# Patient Record
Sex: FEMALE | Race: WHITE | NOT HISPANIC OR LATINO | URBAN - METROPOLITAN AREA
[De-identification: names, ages, dates, MRNs, and addresses within clinical notes are randomized per-mention and may not be internally consistent; named-entity substitution may affect disease eponyms.]

---

## 2020-07-25 ENCOUNTER — TELEPHONE ENCOUNTER (OUTPATIENT)
Dept: URBAN - METROPOLITAN AREA CLINIC 13 | Facility: CLINIC | Age: 65
End: 2020-07-25

## 2020-07-25 RX ORDER — LEVOTHYROXINE SODIUM 0.05 MG/1
TAKE 1 TABLET DAILY TABLET ORAL
Refills: 0 | OUTPATIENT
End: 2019-06-20

## 2020-07-25 RX ORDER — BUDESONIDE 0.5 MG/2ML
INHALANT ORAL
Qty: 120 | Refills: 0 | OUTPATIENT
Start: 2013-09-26 | End: 2015-12-07

## 2020-07-25 RX ORDER — ALUMINUM HYDROXIDE AND MAGNESIUM TRISILICATE 80; 14.2 MG/1; MG/1
TABLET, CHEWABLE ORAL
Refills: 0 | OUTPATIENT
Start: 2008-11-06 | End: 2009-04-30

## 2020-07-25 RX ORDER — POLYETHYLENE GLYCOL 3350, SODIUM CHLORIDE, SODIUM BICARBONATE AND POTASSIUM CHLORIDE WITH LEMON FLAVOR 420; 11.2; 5.72; 1.48 G/4L; G/4L; G/4L; G/4L
USE AS DIRECTED POWDER, FOR SOLUTION ORAL
Qty: 1 | Refills: 0 | OUTPATIENT
Start: 2019-06-20 | End: 2019-08-22

## 2020-07-25 RX ORDER — VENLAFAXINE HYDROCHLORIDE 225 MG/1
TABLET, EXTENDED RELEASE ORAL
Qty: 270 | Refills: 0 | OUTPATIENT
Start: 2019-06-21 | End: 2019-08-22

## 2020-07-25 RX ORDER — SULFAMETHOXAZOLE/TRIMETHOPRIM 800-160 MG
TAKE 1 TABLET TWICE DAILY TABLET ORAL
Refills: 0 | OUTPATIENT
End: 2018-12-12

## 2020-07-25 RX ORDER — AMOXICILLIN AND CLAVULANATE POTASSIUM 875; 125 MG/1; MG/1
TAKE 1 TABLET TWICE DAILY UNTIL FINISHED TABLET, FILM COATED ORAL
Qty: 14 | Refills: 0 | OUTPATIENT
Start: 2018-05-24 | End: 2018-06-04

## 2020-07-25 RX ORDER — PREDNISONE 10 MG/1
TAKE 1 TABLET DAILY AS DIRECTED TABLET ORAL
Refills: 0 | OUTPATIENT
End: 2018-12-12

## 2020-07-25 RX ORDER — HYOSCYAMINE SULFATE 0.12 MG/1
PLACE 1 TABLET EVERY 6 HOURS PRN TABLET, ORALLY DISINTEGRATING ORAL
Qty: 60 | Refills: 1 | OUTPATIENT
Start: 2018-05-24 | End: 2019-08-22

## 2020-07-25 RX ORDER — FLUTICASONE FUROATE AND VILANTEROL TRIFENATATE 200; 25 UG/1; UG/1
INHALE PUFFS  PRN POWDER RESPIRATORY (INHALATION)
Refills: 0 | OUTPATIENT
End: 2018-06-04

## 2020-07-25 RX ORDER — RISEDRONATE SODIUM 129; 21 MG/1; MG/1
TAKE 1 TABLET  MONTH TABLET, FILM COATED ORAL
Refills: 0 | OUTPATIENT
Start: 2009-04-30 | End: 2011-10-17

## 2020-07-26 ENCOUNTER — TELEPHONE ENCOUNTER (OUTPATIENT)
Dept: URBAN - METROPOLITAN AREA CLINIC 13 | Facility: CLINIC | Age: 65
End: 2020-07-26

## 2020-07-26 RX ORDER — FLUTICASONE PROPIONATE 110 UG/1
INHALE 2 PUFFS BY MOUTH TWICE DAILY AEROSOL, METERED RESPIRATORY (INHALATION)
Qty: 12 | Refills: 0 | Status: ACTIVE | COMMUNITY
Start: 2017-08-18

## 2020-07-26 RX ORDER — ALUMINUM HYDROXIDE AND MAGNESIUM TRISILICATE 80; 14.2 MG/1; MG/1
TAKE AS DIRECTED.UNKNOWN DOSE TABLET, CHEWABLE ORAL
Refills: 0 | Status: ACTIVE | COMMUNITY
Start: 2009-04-30

## 2020-07-26 RX ORDER — LIDOCAINE 5 G/100G
APPLY TO AFFECTED AREAS AS DIRECTED OINTMENT TOPICAL
Refills: 0 | Status: ACTIVE | COMMUNITY
Start: 2019-08-01

## 2020-07-26 RX ORDER — AZITHROMYCIN DIHYDRATE 250 MG/1
TAKE 2 TABLETS BY MOUTH TODAY, THEN TAKE 1 TABLET DAILY FOR 4 DAYS TABLET, FILM COATED ORAL
Qty: 6 | Refills: 0 | Status: ACTIVE | COMMUNITY
Start: 2017-12-20

## 2020-07-26 RX ORDER — ALENDRONATE SODIUM 70 MG/1
TAKE 1 TABLET ONCE WEEKLY TABLET ORAL
Refills: 0 | Status: ACTIVE | COMMUNITY

## 2020-07-26 RX ORDER — PREDNISONE 20 MG/1
TAKE 1 TABLET BY MOUTH EVERY DAY TABLET ORAL
Qty: 3 | Refills: 0 | Status: ACTIVE | COMMUNITY
Start: 2018-11-02

## 2020-07-26 RX ORDER — TIZANIDINE 4 MG/1
TAKE 1 TABLET BY MOUTH EVERYDAY AT BEDTIME TABLET ORAL
Qty: 30 | Refills: 0 | Status: ACTIVE | COMMUNITY
Start: 2018-10-17

## 2020-07-26 RX ORDER — ALBUTEROL SULFATE 90 UG/1
INHALE 2 PUFFS BY MOUTH EVERY 6 HOURS AS NEEDED POWDER, METERED RESPIRATORY (INHALATION)
Qty: 1 | Refills: 0 | Status: ACTIVE | COMMUNITY
Start: 2017-12-20

## 2020-07-26 RX ORDER — CALCIPOTRIENE 50 UG/G
APPLY A THIN LAYER TO AFFECTED AREA(S) TWICE DAILY CREAM TOPICAL
Refills: 0 | Status: ACTIVE | COMMUNITY
Start: 2019-08-01

## 2020-07-26 RX ORDER — TRAZODONE HYDROCHLORIDE 50 MG/1
TABLET ORAL
Qty: 30 | Refills: 0 | Status: ACTIVE | COMMUNITY
Start: 2019-05-01

## 2020-07-26 RX ORDER — FLUTICASONE PROPIONATE 110 UG/1
AEROSOL, METERED RESPIRATORY (INHALATION)
Qty: 12 | Refills: 0 | Status: ACTIVE | COMMUNITY
Start: 2019-05-01

## 2020-07-26 RX ORDER — PREDNISONE 10 MG/1
TABLET ORAL
Qty: 100 | Refills: 0 | Status: ACTIVE | COMMUNITY
Start: 2013-02-18

## 2020-07-26 RX ORDER — NAPROXEN 500 MG/1
TAKE 1 TABLET EVERY 12 HOURS AS NEEDED TABLET ORAL
Refills: 0 | Status: ACTIVE | COMMUNITY
Start: 2019-05-01

## 2020-07-26 RX ORDER — FLUCONAZOLE 150 MG/1
TAKE 1 TABLET BY MOUTH THEN REPEAT IN 72 HOURS TABLET ORAL
Qty: 2 | Refills: 0 | Status: ACTIVE | COMMUNITY
Start: 2018-04-11

## 2020-07-26 RX ORDER — BUDESONIDE 0.5 MG/2ML
USE  ONE AS DIRECTED INHALANT ORAL
Refills: 0 | Status: ACTIVE | COMMUNITY

## 2020-07-26 RX ORDER — PREDNISONE 5 MG/1
TAKE 6 TABS DAILY X4 DAYS, 4 TABS DAILY X 4DAYS , 2 TABS DAILY X4 DAYS TABLET ORAL
Qty: 48 | Refills: 0 | Status: ACTIVE | COMMUNITY
Start: 2019-04-12

## 2020-07-26 RX ORDER — CIPROFLOXACIN AND DEXAMETHASONE 3; 1 MG/ML; MG/ML
SUSPENSION/ DROPS AURICULAR (OTIC)
Qty: 8 | Refills: 0 | Status: ACTIVE | COMMUNITY
Start: 2013-05-10

## 2020-07-26 RX ORDER — LEVOTHYROXINE SODIUM 125 UG/1
TABLET ORAL
Qty: 90 | Refills: 0 | Status: ACTIVE | COMMUNITY
Start: 2013-07-09

## 2020-07-26 RX ORDER — ESTRADIOL 0.1 MG/G
CREAM VAGINAL
Qty: 42 | Refills: 0 | Status: ACTIVE | COMMUNITY
Start: 2019-07-23

## 2020-07-26 RX ORDER — TRIAMCINOLONE ACETONIDE 1 MG/G
CREAM TOPICAL
Qty: 80 | Refills: 0 | Status: ACTIVE | COMMUNITY
Start: 2019-05-30

## 2020-07-26 RX ORDER — NYSTATIN 100000 [USP'U]/G
APPLY 1 INCH DAILY AS NEEDED POWDER TOPICAL
Refills: 0 | Status: ACTIVE | COMMUNITY
Start: 2019-07-10

## 2020-07-26 RX ORDER — LEVOTHYROXINE SODIUM 0.07 MG/1
TAKE 1 TABLET DAILY TABLET ORAL
Refills: 0 | Status: ACTIVE | COMMUNITY
Start: 2019-06-06

## 2020-07-26 RX ORDER — OXYCODONE AND ACETAMINOPHEN 5; 325 MG/1; MG/1
TABLET ORAL
Qty: 20 | Refills: 0 | Status: ACTIVE | COMMUNITY
Start: 2013-08-01

## 2020-07-26 RX ORDER — FEXOFENADINE HCL 180 MG/1
TAKE 1 TABLET BY MOUTH EVERY DAY TABLET ORAL
Qty: 14 | Refills: 0 | Status: ACTIVE | COMMUNITY
Start: 2018-11-02

## 2020-07-26 RX ORDER — OFLOXACIN 3 MG/ML
SOLUTION AURICULAR (OTIC)
Qty: 5 | Refills: 0 | Status: ACTIVE | COMMUNITY
Start: 2013-08-18

## 2020-11-24 ENCOUNTER — OFFICE VISIT (OUTPATIENT)
Dept: URBAN - METROPOLITAN AREA CLINIC 113 | Facility: CLINIC | Age: 65
End: 2020-11-24
Payer: COMMERCIAL

## 2020-11-24 VITALS
DIASTOLIC BLOOD PRESSURE: 86 MMHG | WEIGHT: 134 LBS | BODY MASS INDEX: 25.3 KG/M2 | HEIGHT: 61 IN | SYSTOLIC BLOOD PRESSURE: 164 MMHG | TEMPERATURE: 98 F | HEART RATE: 72 BPM

## 2020-11-24 DIAGNOSIS — K21.9 GASTROESOPHAGEAL REFLUX DISEASE, UNSPECIFIED WHETHER ESOPHAGITIS PRESENT: ICD-10-CM

## 2020-11-24 DIAGNOSIS — R10.32 LEFT LOWER QUADRANT ABDOMINAL PAIN: ICD-10-CM

## 2020-11-24 DIAGNOSIS — R19.4 CHANGE IN BOWEL HABIT: ICD-10-CM

## 2020-11-24 DIAGNOSIS — K59.00 CONSTIPATION, UNSPECIFIED CONSTIPATION TYPE: ICD-10-CM

## 2020-11-24 PROCEDURE — 3017F COLORECTAL CA SCREEN DOC REV: CPT | Performed by: INTERNAL MEDICINE

## 2020-11-24 PROCEDURE — G9903 PT SCRN TBCO ID AS NON USER: HCPCS | Performed by: INTERNAL MEDICINE

## 2020-11-24 PROCEDURE — 74177 CT ABD & PELVIS W/CONTRAST: CPT | Performed by: INTERNAL MEDICINE

## 2020-11-24 PROCEDURE — G8427 DOCREV CUR MEDS BY ELIG CLIN: HCPCS | Performed by: INTERNAL MEDICINE

## 2020-11-24 PROCEDURE — 99213 OFFICE O/P EST LOW 20 MIN: CPT | Performed by: INTERNAL MEDICINE

## 2020-11-24 PROCEDURE — G8482 FLU IMMUNIZE ORDER/ADMIN: HCPCS | Performed by: INTERNAL MEDICINE

## 2020-11-24 RX ORDER — PREDNISONE 5 MG/1
TAKE 6 TABS DAILY X4 DAYS, 4 TABS DAILY X 4DAYS , 2 TABS DAILY X4 DAYS TABLET ORAL
Qty: 48 | Refills: 0 | Status: DISCONTINUED | COMMUNITY
Start: 2019-04-12

## 2020-11-24 RX ORDER — CALCIPOTRIENE 50 UG/G
APPLY A THIN LAYER TO AFFECTED AREA(S) TWICE DAILY CREAM TOPICAL
Refills: 0 | Status: DISCONTINUED | COMMUNITY
Start: 2019-08-01

## 2020-11-24 RX ORDER — ESTRADIOL 0.1 MG/G
CREAM VAGINAL
Qty: 42 | Refills: 0 | Status: DISCONTINUED | COMMUNITY
Start: 2019-07-23

## 2020-11-24 RX ORDER — LIDOCAINE 5 G/100G
APPLY TO AFFECTED AREAS AS DIRECTED OINTMENT TOPICAL
Refills: 0 | Status: DISCONTINUED | COMMUNITY
Start: 2019-08-01

## 2020-11-24 RX ORDER — LEVOTHYROXINE SODIUM 0.07 MG/1
TAKE 1 TABLET DAILY TABLET ORAL
Refills: 0 | Status: ACTIVE | COMMUNITY
Start: 2019-06-06

## 2020-11-24 RX ORDER — OFLOXACIN 3 MG/ML
SOLUTION AURICULAR (OTIC)
Qty: 5 | Refills: 0 | Status: DISCONTINUED | COMMUNITY
Start: 2013-08-18

## 2020-11-24 RX ORDER — TRAZODONE HYDROCHLORIDE 50 MG/1
TABLET ORAL
Qty: 30 | Refills: 0 | Status: DISCONTINUED | COMMUNITY
Start: 2019-05-01

## 2020-11-24 RX ORDER — TRIAMCINOLONE ACETONIDE 1 MG/G
CREAM TOPICAL
Qty: 80 | Refills: 0 | Status: DISCONTINUED | COMMUNITY
Start: 2019-05-30

## 2020-11-24 RX ORDER — ALENDRONATE SODIUM 70 MG/1
TAKE 1 TABLET ONCE WEEKLY TABLET ORAL
Refills: 0 | Status: ACTIVE | COMMUNITY

## 2020-11-24 RX ORDER — PREDNISONE 10 MG/1
TABLET ORAL
Qty: 100 | Refills: 0 | Status: DISCONTINUED | COMMUNITY
Start: 2013-02-18

## 2020-11-24 RX ORDER — NYSTATIN 100000 [USP'U]/G
APPLY 1 INCH DAILY AS NEEDED POWDER TOPICAL
Refills: 0 | Status: DISCONTINUED | COMMUNITY
Start: 2019-07-10

## 2020-11-24 RX ORDER — FLUTICASONE PROPIONATE 110 UG/1
INHALE 2 PUFFS BY MOUTH TWICE DAILY AEROSOL, METERED RESPIRATORY (INHALATION)
Qty: 12 | Refills: 0 | Status: ACTIVE | COMMUNITY
Start: 2017-08-18

## 2020-11-24 RX ORDER — HYDROCHLOROTHIAZIDE 12.5 MG/1
1 TABLET IN THE MORNING TABLET ORAL ONCE A DAY
Status: ACTIVE | COMMUNITY

## 2020-11-24 RX ORDER — FLUCONAZOLE 150 MG/1
TAKE 1 TABLET BY MOUTH THEN REPEAT IN 72 HOURS TABLET ORAL
Qty: 2 | Refills: 0 | Status: DISCONTINUED | COMMUNITY
Start: 2018-04-11

## 2020-11-24 RX ORDER — CIPROFLOXACIN AND DEXAMETHASONE 3; 1 MG/ML; MG/ML
SUSPENSION/ DROPS AURICULAR (OTIC)
Qty: 8 | Refills: 0 | Status: DISCONTINUED | COMMUNITY
Start: 2013-05-10

## 2020-11-24 RX ORDER — FEXOFENADINE HCL 180 MG/1
TAKE 1 TABLET BY MOUTH EVERY DAY TABLET ORAL
Qty: 14 | Refills: 0 | Status: DISCONTINUED | COMMUNITY
Start: 2018-11-02

## 2020-11-24 RX ORDER — OXYCODONE AND ACETAMINOPHEN 5; 325 MG/1; MG/1
TABLET ORAL
Qty: 20 | Refills: 0 | Status: DISCONTINUED | COMMUNITY
Start: 2013-08-01

## 2020-11-24 RX ORDER — LEVOTHYROXINE SODIUM 0.07 MG/1
1 CAPSULE TABLET ORAL ONCE A DAY
Refills: 0 | Status: DISCONTINUED | COMMUNITY
Start: 2013-07-09

## 2020-11-24 RX ORDER — OMEPRAZOLE 20 MG/1
1 CAPSULE 30 MINUTES BEFORE MORNING MEAL CAPSULE, DELAYED RELEASE ORAL ONCE A DAY
Status: ACTIVE | COMMUNITY

## 2020-11-24 RX ORDER — AZITHROMYCIN DIHYDRATE 250 MG/1
TAKE 2 TABLETS BY MOUTH TODAY, THEN TAKE 1 TABLET DAILY FOR 4 DAYS TABLET, FILM COATED ORAL
Qty: 6 | Refills: 0 | Status: DISCONTINUED | COMMUNITY
Start: 2017-12-20

## 2020-11-24 RX ORDER — TIZANIDINE 4 MG/1
TAKE 1 TABLET BY MOUTH EVERYDAY AT BEDTIME TABLET ORAL
Qty: 30 | Refills: 0 | Status: DISCONTINUED | COMMUNITY
Start: 2018-10-17

## 2020-11-24 RX ORDER — ALUMINUM HYDROXIDE AND MAGNESIUM TRISILICATE 80; 14.2 MG/1; MG/1
TAKE AS DIRECTED.UNKNOWN DOSE TABLET, CHEWABLE ORAL
Refills: 0 | Status: ACTIVE | COMMUNITY
Start: 2009-04-30

## 2020-11-24 RX ORDER — ALBUTEROL SULFATE 90 UG/1
INHALE 2 PUFFS BY MOUTH EVERY 6 HOURS AS NEEDED POWDER, METERED RESPIRATORY (INHALATION)
Qty: 1 | Refills: 0 | Status: ACTIVE | COMMUNITY
Start: 2017-12-20

## 2020-11-24 RX ORDER — PREDNISONE 20 MG/1
TAKE 1 TABLET BY MOUTH EVERY DAY TABLET ORAL
Qty: 3 | Refills: 0 | Status: DISCONTINUED | COMMUNITY
Start: 2018-11-02

## 2020-11-24 RX ORDER — NAPROXEN 500 MG/1
TAKE 1 TABLET EVERY 12 HOURS AS NEEDED TABLET ORAL
Refills: 0 | Status: ACTIVE | COMMUNITY
Start: 2019-05-01

## 2020-11-24 RX ORDER — BUDESONIDE 0.5 MG/2ML
USE  ONE AS DIRECTED INHALANT ORAL
Refills: 0 | Status: ACTIVE | COMMUNITY

## 2020-11-24 NOTE — HPI-TODAY'S VISIT:
65-year-old with history of gastroesophageal reflux disease on omeprazole 40 mg each day.  Her last colonoscopy was in August 22, 2019 that revealed a normal terminal ileum, pancolonic large diverticulosis of the colon but no colon polyps.  She had an upper endoscopy in 2008 that was fairly unremarkable Since September she has been having some abdominal pains.  They are in the lower pelvic region the left side greater than the right.  She saw her GYN about 2 days later because of persistence of the discomfort.  She had no dysuria they felt like she may have had an early UTI the culture was negative and she was treated with Macrobid and it may have improved.  However 2 weeks later she continued to have the symptoms and underwent a pelvic ultrasound that was unremarkable.  She gradually was better and then about 2 weeks ago she began having difficulty again she describes the pain is in the lower pelvic region the left side greater than the right.  It does increase when she leans forward and bends over and when she slouches.  It may temporarily be better after bowel movement.  Her bowel habits have been more more constipated over the last 3 to 4 months.  She has a bowel movement every other day and sometimes strains.  Is been no blood or melena or weight loss.  She occasionally has nausea but no vomiting.  There is been no fever no dysuria or hematuria.  She has no heartburn or dysphagia. Since September she has been having some abdominal pains.  They are in the lower pelvic region the left side greater than the right.  She saw her GYN about 2 days later because of persistence of the discomfort.  She had no dysuria they felt like she may have had an early UTI the culture was negative and she was treated with Macrobid and it may have improved.  However 2 weeks later she continued to have the symptoms and underwent a pelvic ultrasound that was unremarkable.  She gradually was better and then about 2 weeks ago she began having difficulty again she describes the pain is in the lower pelvic region the left side greater than the right.  It does increase when she leans forward and bends over and when she slouches.  It may temporarily be better after bowel movement.  Her bowel habits have been more more constipated over the last 3 to 4 months.  She has a bowel movement every other day and sometimes strains.  Is been no blood or melena or weight loss.  She occasionally has nausea but no vomiting.  There is been no fever no dysuria or hematuria.  She has no heartburn or dysphagia.

## 2020-12-03 ENCOUNTER — TELEPHONE ENCOUNTER (OUTPATIENT)
Dept: URBAN - METROPOLITAN AREA CLINIC 113 | Facility: CLINIC | Age: 65
End: 2020-12-03

## 2020-12-04 NOTE — PHYSICAL EXAM GASTROINTESTINAL
Abdomen , soft, nontender, nondistended , no guarding or rigidity , no masses palpable , normal bowel sounds , Liver and Spleen , no hepatomegaly present , no hepatosplenomegaly , liver nontender , spleen not palpable.  Mild tenderness in left groin area.  Hip movement does not change pain Pt seen     Pls advise her how to retrieve work letter I wrote today    Please advise her to continue to monitor her sx and not hesitate to seek urgent care to further evaluate her sx if no improvement.  Thanks Shauna

## 2020-12-30 ENCOUNTER — OFFICE VISIT (OUTPATIENT)
Dept: URBAN - METROPOLITAN AREA CLINIC 113 | Facility: CLINIC | Age: 65
End: 2020-12-30
Payer: COMMERCIAL

## 2020-12-30 VITALS — WEIGHT: 134 LBS | RESPIRATION RATE: 18 BRPM | HEIGHT: 61 IN | TEMPERATURE: 98.1 F | BODY MASS INDEX: 25.3 KG/M2

## 2020-12-30 DIAGNOSIS — R19.4 CHANGE IN BOWEL HABIT: ICD-10-CM

## 2020-12-30 DIAGNOSIS — K40.90 INGUINAL HERNIA: ICD-10-CM

## 2020-12-30 DIAGNOSIS — K59.00 CONSTIPATION, UNSPECIFIED CONSTIPATION TYPE: ICD-10-CM

## 2020-12-30 PROBLEM — 129851009: Status: ACTIVE | Noted: 2020-11-24

## 2020-12-30 PROBLEM — 14760008: Status: ACTIVE | Noted: 2020-11-24

## 2020-12-30 PROCEDURE — G8427 DOCREV CUR MEDS BY ELIG CLIN: HCPCS | Performed by: NURSE PRACTITIONER

## 2020-12-30 PROCEDURE — 99213 OFFICE O/P EST LOW 20 MIN: CPT | Performed by: NURSE PRACTITIONER

## 2020-12-30 PROCEDURE — G8483 FLU IMM NO ADMIN DOC REA: HCPCS | Performed by: NURSE PRACTITIONER

## 2020-12-30 RX ORDER — ALBUTEROL SULFATE 90 UG/1
INHALE 2 PUFFS BY MOUTH EVERY 6 HOURS AS NEEDED POWDER, METERED RESPIRATORY (INHALATION)
Qty: 1 | Refills: 0 | Status: ACTIVE | COMMUNITY
Start: 2017-12-20

## 2020-12-30 RX ORDER — HYDROCHLOROTHIAZIDE 12.5 MG/1
1 TABLET IN THE MORNING TABLET ORAL ONCE A DAY
Status: DISCONTINUED | COMMUNITY

## 2020-12-30 RX ORDER — FLUTICASONE PROPIONATE 110 UG/1
INHALE 2 PUFFS BY MOUTH TWICE DAILY AEROSOL, METERED RESPIRATORY (INHALATION)
Qty: 12 | Refills: 0 | Status: DISCONTINUED | COMMUNITY
Start: 2017-08-18

## 2020-12-30 RX ORDER — NAPROXEN 500 MG/1
TAKE 1 TABLET EVERY 12 HOURS AS NEEDED TABLET ORAL
Refills: 0 | Status: DISCONTINUED | COMMUNITY
Start: 2019-05-01

## 2020-12-30 RX ORDER — BUDESONIDE 0.5 MG/2ML
USE  ONE AS DIRECTED INHALANT ORAL
Refills: 0 | Status: ACTIVE | COMMUNITY

## 2020-12-30 RX ORDER — ALENDRONATE SODIUM 70 MG/1
TAKE 1 TABLET ONCE WEEKLY TABLET ORAL
Refills: 0 | Status: ACTIVE | COMMUNITY

## 2020-12-30 RX ORDER — ALUMINUM HYDROXIDE AND MAGNESIUM TRISILICATE 80; 14.2 MG/1; MG/1
TAKE AS DIRECTED.UNKNOWN DOSE TABLET, CHEWABLE ORAL
Refills: 0 | Status: DISCONTINUED | COMMUNITY
Start: 2009-04-30

## 2020-12-30 RX ORDER — LEVOTHYROXINE SODIUM 0.07 MG/1
TAKE 1 TABLET DAILY TABLET ORAL
Refills: 0 | Status: ACTIVE | COMMUNITY
Start: 2019-06-06

## 2020-12-30 RX ORDER — OMEPRAZOLE 20 MG/1
1 CAPSULE 30 MINUTES BEFORE MORNING MEAL CAPSULE, DELAYED RELEASE ORAL ONCE A DAY
Status: ACTIVE | COMMUNITY

## 2020-12-30 RX ORDER — LOSARTAN POTASSIUM AND HYDROCHLOROTHIAZIDE 50; 12.5 MG/1; MG/1
1 TABLET TABLET, FILM COATED ORAL ONCE A DAY
Status: ACTIVE | COMMUNITY

## 2020-12-30 NOTE — HPI-OTHER HISTORIES
65-year-old woman with history of gastroesophageal reflux disease on omeprazole 40 mg daily, and diverticulosis, presenting for follow-up regarding left lower abdominal pain.  She was last seen in the office on 11/24/2020 with intermittent lower pelvic pain, left greater than right.  She had been evaluated by GYN with pelvic ultrasound and urine studies both of which were unremarkable.  She was treated empirically with Macrobid for possible urinary tract infection without any benefit.  Pain has been persistent since September, worsening 2 weeks prior to the appointment.  She denied any fevers.  She was recommended CBC and CT of the abdomen and pelvis to evaluate for any evidence of diverticulitis.she was recommended IBgard to help with discomfort.  CT of the abdomen and pelvis on 12/3/2020 revealed no acute abdominopelvic findings.  There was extensive diverticulosis without evidence of diverticulitis.there is a 5 mm left renal hypodensity, too small to accurately characterize which was recommended further evaluation with renal ultrasound in 6 months. Small fat filled left inguinal hernia.  labs 11/24/2020: WBC 6.3, hemoglobin 14.3, hematocrit 43.3, MCV 92, platelets 259, BUN 11, creatinine 0.62, sodium 137, potassium 3.8, T bili 0.5, ALP 52, AST 34, ALT 22, TSH 0.211  She complains of left lower pelvic pain, likely related to the inguinal hernia. She has discomfort with positioning and crossing her legs.She has noted some improvement with an improved bowel regimen. She is taking MiraLAX every other day. She has reintroduced a high fiber diet with improvement in gas and bloating complaints. She is otherwise doing very well from a GI standpoint. She is not interested in a surgical referral at this time.

## 2021-08-23 LAB
A/G RATIO: 1.8
ALBUMIN: 4.8
ALKALINE PHOSPHATASE: 52
ALT (SGPT): 22
AST (SGOT): 34
BASO (ABSOLUTE): 0
BASOS: 1
BILIRUBIN, TOTAL: 0.5
BUN/CREATININE RATIO: 18
BUN: 11
CALCIUM: 9.6
CARBON DIOXIDE, TOTAL: 26
CHLORIDE: 94
CREATININE: 0.62
EGFR IF AFRICN AM: 109
EGFR IF NONAFRICN AM: 95
EOS (ABSOLUTE): 0.4
EOS: 7
GLOBULIN, TOTAL: 2.6
GLUCOSE: 89
HEMATOCRIT: 43.3
HEMATOLOGY COMMENTS:: (no result)
HEMOGLOBIN: 14.3
IMMATURE CELLS: (no result)
IMMATURE GRANS (ABS): 0
IMMATURE GRANULOCYTES: 0
LYMPHS (ABSOLUTE): 1.8
LYMPHS: 28
MCH: 30.5
MCHC: 33
MCV: 92
MONOCYTES(ABSOLUTE): 0.5
MONOCYTES: 9
NEUTROPHILS (ABSOLUTE): 3.5
NEUTROPHILS: 55
NRBC: (no result)
PLATELETS: 259
POTASSIUM: 3.8
PROTEIN, TOTAL: 7.4
RBC: 4.69
RDW: 12.6
SODIUM: 137
TSH: 0.21
WBC: 6.3

## 2024-03-28 ENCOUNTER — OV NP (OUTPATIENT)
Dept: URBAN - METROPOLITAN AREA CLINIC 113 | Facility: CLINIC | Age: 69
End: 2024-03-28
Payer: MEDICARE

## 2024-03-28 VITALS
HEIGHT: 61 IN | HEART RATE: 72 BPM | TEMPERATURE: 97.3 F | DIASTOLIC BLOOD PRESSURE: 69 MMHG | RESPIRATION RATE: 18 BRPM | BODY MASS INDEX: 23.3 KG/M2 | SYSTOLIC BLOOD PRESSURE: 139 MMHG | WEIGHT: 123.4 LBS

## 2024-03-28 DIAGNOSIS — R10.32 LEFT LOWER QUADRANT ABDOMINAL PAIN: ICD-10-CM

## 2024-03-28 PROCEDURE — 99203 OFFICE O/P NEW LOW 30 MIN: CPT | Performed by: NURSE PRACTITIONER

## 2024-03-28 RX ORDER — ALBUTEROL SULFATE 90 UG/1
INHALE 2 PUFFS BY MOUTH EVERY 6 HOURS AS NEEDED POWDER, METERED RESPIRATORY (INHALATION)
Qty: 1 | Refills: 0 | Status: ACTIVE | COMMUNITY
Start: 2017-12-20

## 2024-03-28 RX ORDER — LEVOTHYROXINE SODIUM 0.07 MG/1
TAKE 1 TABLET DAILY TABLET ORAL
Refills: 0 | Status: ACTIVE | COMMUNITY
Start: 2019-06-06

## 2024-03-28 RX ORDER — OMEPRAZOLE 20 MG/1
1 CAPSULE 30 MINUTES BEFORE MORNING MEAL CAPSULE, DELAYED RELEASE ORAL ONCE A DAY
Status: ACTIVE | COMMUNITY

## 2024-03-28 RX ORDER — ALENDRONATE SODIUM 70 MG/1
TAKE 1 TABLET ONCE WEEKLY TABLET ORAL
Refills: 0 | Status: ACTIVE | COMMUNITY

## 2024-03-28 RX ORDER — BUDESONIDE 0.5 MG/2ML
USE  ONE AS DIRECTED INHALANT ORAL
Refills: 0 | Status: ACTIVE | COMMUNITY

## 2024-03-28 RX ORDER — LOSARTAN POTASSIUM AND HYDROCHLOROTHIAZIDE 50; 12.5 MG/1; MG/1
1 TABLET TABLET, FILM COATED ORAL ONCE A DAY
Status: ACTIVE | COMMUNITY

## 2024-03-28 NOTE — HPI-TODAY'S VISIT:
69 yo woman presenting for evaluation of "extreme abdominal pain." She was last seen in the office in December 2020 for a constipation predominant change in bowel habit with constipation and left lower abdominal pain. CTa/p with contrast revealed a left sided inguinal hernia. She was not interested in a surgical referral at that time as her symptoms were significantly improved.  She presents today for complaints an episode of lower abdominal spasms. She thinks that she had a flare of IBS, though she had never had this before. She had increased bowel habits, prompting her to increase dietary fiber. She had some post prandial urgency. She denies any loose or watery stools. Stool consistency was typically formed, and with a sensation of feeling relieved. She would then have some lower abdominal cramping or spasm. She started following a low FODMAP diet, and has had resolve of her symptoms. There is no blood per rectum. She started taking Culturelle, and feels that this has been helpful. She has been taking this for about 3 weeks. Gas and bloating are not an issue. She is starting to reintroduce some FODMAP foods with good tolerability.